# Patient Record
(demographics unavailable — no encounter records)

---

## 2024-12-18 NOTE — HISTORY OF PRESENT ILLNESS
[de-identified] : Date of surgery: 9.9.24 (3 months) [de-identified] : 3 months status post left trigger thumb release and long finger trigger finger release, tenolysis and tenosynovectomy.  The patient reports persistent pain and stiffness in the left long finger.  No locking or catching.  Overall improved relative to before surgery. [de-identified] : Nontender over the left thumb A1 pulley.  Mild tenderness over the left long finger A1 pulley and DIP joint.  There is a -5 degree MCP flexion contracture, partially passively correctable, and the presence of pretendinous Dupuytren's cords.  Full, symmetric digital range of motion aside from the left long finger, which exhibits decreased terminal active flexion as well. [de-identified] : I had a lengthy discussion with the patient today regarding her recovery from the left  thumb and long finger flexor tendon tenosynovectomy and tenolysis, tendon sheath.  I explained that overall, she demonstrates improvement relative to before surgery, however, there is still residual stiffness and discomfort at the left long finger, which may be secondary to her flared up Dupuytren's disease and concomitant arthritis, particular at the DIP joint.  I recommended continued hand therapy and working on her mobility.  She understood that complete resolution of symptoms and a completely pain-free finger may not be achievable, as was discussed before surgery.  I recommended she follow back up with me in 3 months to monitor progress.